# Patient Record
Sex: MALE | Race: WHITE | NOT HISPANIC OR LATINO | URBAN - METROPOLITAN AREA
[De-identification: names, ages, dates, MRNs, and addresses within clinical notes are randomized per-mention and may not be internally consistent; named-entity substitution may affect disease eponyms.]

---

## 2019-02-08 PROBLEM — G56.02 CARPAL TUNNEL SYNDROME, LEFT: Status: ACTIVE | Noted: 2019-02-08

## 2023-06-13 PROBLEM — R53.1 LEFT-SIDED WEAKNESS: Status: ACTIVE | Noted: 2023-06-13

## 2023-06-13 PROBLEM — G45.9 TIA (TRANSIENT ISCHEMIC ATTACK): Status: ACTIVE | Noted: 2023-06-13

## 2023-06-13 PROBLEM — I10 ESSENTIAL (PRIMARY) HYPERTENSION: Status: ACTIVE | Noted: 2023-06-13

## 2023-06-13 PROBLEM — Z71.89 ACP (ADVANCE CARE PLANNING): Status: ACTIVE | Noted: 2023-06-13

## 2023-06-14 ENCOUNTER — TELEPHONE (OUTPATIENT)
Dept: NEUROLOGY | Facility: CLINIC | Age: 73
End: 2023-06-14

## 2023-06-14 PROBLEM — I63.9 NEUROLOGIC DEFICIT DUE TO ACUTE ISCHEMIC CEREBROVASCULAR ACCIDENT (CVA): Status: ACTIVE | Noted: 2023-06-13

## 2023-06-14 PROBLEM — R29.818 NEUROLOGIC DEFICIT DUE TO ACUTE ISCHEMIC CEREBROVASCULAR ACCIDENT (CVA): Status: ACTIVE | Noted: 2023-06-13

## 2023-06-14 NOTE — TELEPHONE ENCOUNTER
----- Message from Jojo Aaron sent at 6/14/2023 12:07 PM CDT -----  Regarding: hospital f/u appt  Contact: Jennifer ivan Harry  Type:  Sooner Appointment Request    Caller is requesting a sooner appointment.  Caller declined first available appointment listed below.  Caller will not accept being placed on the waitlist and is requesting a message be sent to doctor.    Name of Caller:  Jennifer love [facility]  When is the first available appointment?    Symptoms: discharged 06/14/23  Irina dx CVA  Best Call Back Number:  [phone] (home)   Additional Information:  Patient needs first available. Patient saw Dr Main in the hospital. Please call patient to advise.Thanks!

## 2023-09-18 PROBLEM — I63.9 NEUROLOGIC DEFICIT DUE TO ACUTE ISCHEMIC CEREBROVASCULAR ACCIDENT (CVA): Status: RESOLVED | Noted: 2023-06-13 | Resolved: 2023-09-18

## 2023-09-18 PROBLEM — R29.818 NEUROLOGIC DEFICIT DUE TO ACUTE ISCHEMIC CEREBROVASCULAR ACCIDENT (CVA): Status: RESOLVED | Noted: 2023-06-13 | Resolved: 2023-09-18
